# Patient Record
Sex: MALE | Race: WHITE
[De-identification: names, ages, dates, MRNs, and addresses within clinical notes are randomized per-mention and may not be internally consistent; named-entity substitution may affect disease eponyms.]

---

## 2018-12-21 ENCOUNTER — HOSPITAL ENCOUNTER (EMERGENCY)
Dept: HOSPITAL 35 - ER | Age: 68
Discharge: HOME | End: 2018-12-21
Payer: COMMERCIAL

## 2018-12-21 VITALS — WEIGHT: 225 LBS | BODY MASS INDEX: 33.33 KG/M2 | HEIGHT: 69 IN

## 2018-12-21 VITALS — DIASTOLIC BLOOD PRESSURE: 90 MMHG | SYSTOLIC BLOOD PRESSURE: 162 MMHG

## 2018-12-21 DIAGNOSIS — E78.5: ICD-10-CM
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
## 2018-12-21 DIAGNOSIS — S61.211A: Primary | ICD-10-CM

## 2018-12-21 DIAGNOSIS — S61.213A: ICD-10-CM
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
## 2018-12-24 ENCOUNTER — HOSPITAL ENCOUNTER (EMERGENCY)
Dept: HOSPITAL 35 - ER | Age: 68
Discharge: HOME | End: 2018-12-24
Payer: COMMERCIAL

## 2018-12-24 VITALS — WEIGHT: 225 LBS | BODY MASS INDEX: 33.33 KG/M2 | HEIGHT: 69 IN

## 2018-12-24 VITALS — SYSTOLIC BLOOD PRESSURE: 135 MMHG | DIASTOLIC BLOOD PRESSURE: 75 MMHG

## 2018-12-24 DIAGNOSIS — K75.9: ICD-10-CM

## 2018-12-24 DIAGNOSIS — S61.211D: Primary | ICD-10-CM
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
## 2018-12-24 DIAGNOSIS — E78.5: ICD-10-CM

## 2018-12-24 DIAGNOSIS — S61.213D: ICD-10-CM

## 2020-08-04 ENCOUNTER — HOSPITAL ENCOUNTER (EMERGENCY)
Dept: HOSPITAL 35 - ER | Age: 70
Discharge: HOME | End: 2020-08-04
Payer: COMMERCIAL

## 2020-08-04 VITALS — BODY MASS INDEX: 32.21 KG/M2 | HEIGHT: 70 IN | WEIGHT: 225 LBS

## 2020-08-04 VITALS — SYSTOLIC BLOOD PRESSURE: 143 MMHG | DIASTOLIC BLOOD PRESSURE: 76 MMHG
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
## 2020-08-04 DIAGNOSIS — S20.212A: ICD-10-CM
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
## 2020-08-04 DIAGNOSIS — S00.83XA: ICD-10-CM

## 2020-08-04 DIAGNOSIS — S82.831A: Primary | ICD-10-CM

## 2020-08-04 DIAGNOSIS — E78.5: ICD-10-CM
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
## 2020-08-04 DIAGNOSIS — Z79.899: ICD-10-CM
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
## 2021-10-22 ENCOUNTER — HOSPITAL ENCOUNTER (INPATIENT)
Dept: HOSPITAL 35 - ER | Age: 71
LOS: 2 days | Discharge: HOME | DRG: 149 | End: 2021-10-24
Attending: HOSPITALIST | Admitting: HOSPITALIST
Payer: COMMERCIAL

## 2021-10-22 VITALS — SYSTOLIC BLOOD PRESSURE: 140 MMHG | DIASTOLIC BLOOD PRESSURE: 53 MMHG

## 2021-10-22 VITALS — DIASTOLIC BLOOD PRESSURE: 86 MMHG | SYSTOLIC BLOOD PRESSURE: 150 MMHG

## 2021-10-22 VITALS — WEIGHT: 212 LBS | BODY MASS INDEX: 31.4 KG/M2 | HEIGHT: 69.02 IN

## 2021-10-22 VITALS — DIASTOLIC BLOOD PRESSURE: 80 MMHG | SYSTOLIC BLOOD PRESSURE: 140 MMHG

## 2021-10-22 VITALS — SYSTOLIC BLOOD PRESSURE: 161 MMHG | DIASTOLIC BLOOD PRESSURE: 86 MMHG

## 2021-10-22 VITALS — DIASTOLIC BLOOD PRESSURE: 61 MMHG | SYSTOLIC BLOOD PRESSURE: 123 MMHG

## 2021-10-22 DIAGNOSIS — F41.9: ICD-10-CM

## 2021-10-22 DIAGNOSIS — E78.5: ICD-10-CM

## 2021-10-22 DIAGNOSIS — K58.9: ICD-10-CM

## 2021-10-22 DIAGNOSIS — F31.9: ICD-10-CM

## 2021-10-22 DIAGNOSIS — H81.10: Primary | ICD-10-CM

## 2021-10-22 DIAGNOSIS — Z79.899: ICD-10-CM

## 2021-10-22 DIAGNOSIS — Z20.822: ICD-10-CM

## 2021-10-22 LAB
ALBUMIN SERPL-MCNC: 3.6 G/DL (ref 3.4–5)
ALT SERPL-CCNC: 32 U/L (ref 30–65)
ANION GAP SERPL CALC-SCNC: 9 MMOL/L (ref 7–16)
AST SERPL-CCNC: 32 U/L (ref 15–37)
BASOPHILS NFR BLD AUTO: 0.6 % (ref 0–2)
BILIRUB SERPL-MCNC: 0.5 MG/DL (ref 0.2–1)
BILIRUB UR-MCNC: NEGATIVE MG/DL
BUN SERPL-MCNC: 15 MG/DL (ref 7–18)
CALCIUM SERPL-MCNC: 8.9 MG/DL (ref 8.5–10.1)
CHLORIDE SERPL-SCNC: 105 MMOL/L (ref 98–107)
CO2 SERPL-SCNC: 28 MMOL/L (ref 21–32)
COLOR UR: YELLOW
CREAT SERPL-MCNC: 0.9 MG/DL (ref 0.7–1.3)
EOSINOPHIL NFR BLD: 1.2 % (ref 0–3)
ERYTHROCYTE [DISTWIDTH] IN BLOOD BY AUTOMATED COUNT: 12.4 % (ref 10.5–14.5)
FOLATE SERPL-MCNC: 19.7 NG/ML (ref 8.6–58.9)
GLUCOSE SERPL-MCNC: 107 MG/DL (ref 74–106)
GRANULOCYTES NFR BLD MANUAL: 63.3 % (ref 36–66)
HCT VFR BLD CALC: 42.6 % (ref 42–52)
HGB BLD-MCNC: 14.2 GM/DL (ref 14–18)
KETONES UR STRIP-MCNC: NEGATIVE MG/DL
LYMPHOCYTES NFR BLD AUTO: 26.4 % (ref 24–44)
MCH RBC QN AUTO: 30.6 PG (ref 26–34)
MCHC RBC AUTO-ENTMCNC: 33.4 G/DL (ref 28–37)
MCV RBC: 91.5 FL (ref 80–100)
MONOCYTES NFR BLD: 8.5 % (ref 1–8)
NEUTROPHILS # BLD: 3.9 THOU/UL (ref 1.4–8.2)
PLATELET # BLD: 357 THOU/UL (ref 150–400)
POTASSIUM SERPL-SCNC: 3.7 MMOL/L (ref 3.5–5.1)
PROT SERPL-MCNC: 6.8 G/DL (ref 6.4–8.2)
RBC # BLD AUTO: 4.66 MIL/UL (ref 4.5–6)
RBC # UR STRIP: NEGATIVE /UL
SODIUM SERPL-SCNC: 142 MMOL/L (ref 136–145)
SP GR UR STRIP: 1.02 (ref 1–1.03)
URINE CLARITY: CLEAR
URINE GLUCOSE-RANDOM*: NEGATIVE
URINE LEUKOCYTES-REFLEX: NEGATIVE
URINE NITRITE-REFLEX: NEGATIVE
URINE PROTEIN (DIPSTICK): NEGATIVE
UROBILINOGEN UR STRIP-ACNC: 1 E.U./DL (ref 0.2–1)
VIT B12 SERPL-MCNC: 495 PG/ML (ref 193–986)
WBC # BLD AUTO: 6.2 THOU/UL (ref 4–11)

## 2021-10-22 PROCEDURE — 10194: CPT

## 2021-10-22 NOTE — EKG
[address]  [address]
Phone:  [phone]                    ELECTROCARDIOGRAM REPORT      
_______________________________________________________________________________
 
Name:       JESUS MANUEL GÓMEZ                [facility]                     [id number]     Account #:      [id number]  
Admission:  10/22/21    Attend Phys:                          
Discharge:              Date of Birth:  [birth year]  
                                                          Report #: [id number]
   [id number]
_______________________________________________________________________________
                         [facility] ED
                                       
Test Date:    2021-10-22               Test Time:    14:28:30
Pat Name:     JESUS MANUEL GÓMEZ           Department:   
Patient ID:   [id number]            Room:          
Gender:       M                        Technician:   ZOILA
:          1950               Requested By: Marycarmen Rowell
Order Number: [id number] MD:   Lj Palmer
                                 Measurements
Intervals                              Axis          
Rate:         64                       P:            16
NV:           140                      QRS:          47
QRSD:         81                       T:            66
QT:           413                                    
QTc:          426                                    
                           Interpretive Statements
Sinus rhythm
Abnormal R-wave progression, early transition
No previous ECG available for comparison
Electronically Signed On 10- 16:10:19 CDT by Lj Palmer
[email or web address]
 
 
 
 
 
 
 
 
 
 
 
 
 
 
 
 
 
 
 
 
 
 
  <ELECTRONICALLY SIGNED>
   By: Lj Palmer MD, [facility]    
  10/22/21     1610
D: 10/22/21 1428                           _____________________________________
T: 10/22/21 1428                           Lj Palmer MD, FACC      /EPI

## 2021-10-23 VITALS — DIASTOLIC BLOOD PRESSURE: 67 MMHG | SYSTOLIC BLOOD PRESSURE: 137 MMHG

## 2021-10-23 VITALS — SYSTOLIC BLOOD PRESSURE: 139 MMHG | DIASTOLIC BLOOD PRESSURE: 68 MMHG

## 2021-10-23 VITALS — DIASTOLIC BLOOD PRESSURE: 70 MMHG | SYSTOLIC BLOOD PRESSURE: 118 MMHG

## 2021-10-23 VITALS — SYSTOLIC BLOOD PRESSURE: 130 MMHG | DIASTOLIC BLOOD PRESSURE: 77 MMHG

## 2021-10-23 LAB
ANION GAP SERPL CALC-SCNC: 8 MMOL/L (ref 7–16)
BASOPHILS NFR BLD AUTO: 0.2 % (ref 0–2)
BUN SERPL-MCNC: 15 MG/DL (ref 7–18)
CALCIUM SERPL-MCNC: 8.2 MG/DL (ref 8.5–10.1)
CHLORIDE SERPL-SCNC: 109 MMOL/L (ref 98–107)
CHOLEST SERPL-MCNC: 145 MG/DL (ref ?–200)
CO2 SERPL-SCNC: 29 MMOL/L (ref 21–32)
CREAT SERPL-MCNC: 0.9 MG/DL (ref 0.7–1.3)
EOSINOPHIL NFR BLD: 1.3 % (ref 0–3)
ERYTHROCYTE [DISTWIDTH] IN BLOOD BY AUTOMATED COUNT: 12.3 % (ref 10.5–14.5)
EST. AVERAGE GLUCOSE BLD GHB EST-MCNC: 114 MG/DL
GLUCOSE SERPL-MCNC: 98 MG/DL (ref 74–106)
GLYCOHEMOGLOBIN (HGB A1C): 5.6 % (ref 4.8–5.6)
GRANULOCYTES NFR BLD MANUAL: 58 % (ref 36–66)
HCT VFR BLD CALC: 37.3 % (ref 42–52)
HDLC SERPL-MCNC: 52 MG/DL (ref 40–?)
HGB BLD-MCNC: 12.6 GM/DL (ref 14–18)
LDLC SERPL-MCNC: 80 MG/DL (ref ?–100)
LYMPHOCYTES NFR BLD AUTO: 30.8 % (ref 24–44)
MAGNESIUM SERPL-MCNC: 1.9 MG/DL (ref 1.8–2.4)
MCH RBC QN AUTO: 31 PG (ref 26–34)
MCHC RBC AUTO-ENTMCNC: 33.9 G/DL (ref 28–37)
MCV RBC: 91.6 FL (ref 80–100)
MONOCYTES NFR BLD: 9.7 % (ref 1–8)
NEUTROPHILS # BLD: 3.7 THOU/UL (ref 1.4–8.2)
PLATELET # BLD: 309 THOU/UL (ref 150–400)
POTASSIUM SERPL-SCNC: 3.6 MMOL/L (ref 3.5–5.1)
RBC # BLD AUTO: 4.07 MIL/UL (ref 4.5–6)
SODIUM SERPL-SCNC: 146 MMOL/L (ref 136–145)
TC:HDL: 2.8 RATIO
TRIGL SERPL-MCNC: 66 MG/DL (ref ?–150)
VLDLC SERPL CALC-MCNC: 13 MG/DL (ref ?–40)
WBC # BLD AUTO: 6.5 THOU/UL (ref 4–11)

## 2021-10-23 NOTE — NUR
PT ARRIVED TO THE UNIT AT APPROXIMATELY 1900 FROM ED. ACCOMPAINIED BY STAFF.
PT REMAIN ALERT AND ORIENT TIMES FOUR. DENIES PAIN AND NAUSEA. DID C/O HAVING
A HEADACHE LATER IN THE MORNING. PRN TYLENOL GIVEN WITH GOOD RESULTS. VSS,
AFEBRILE. SR PER MONITOR.  , MATT AT THE BEDSIDE AND ORDERED A
ROUTINE MRI OF HEAD R/T DIZZINESS.  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
DENIES VERTIGO. UP WITH STANDBY ASSIST TO TOILET. UP WITH GB AND WALKER. GAIT
SLOW AND STEADY PER TECH.  PT/OT CONSULT PLACED. SLOW PROGRESS TOWARDS DC
GOALS, WILL CONTINUE TO MONITOR.

## 2021-10-24 VITALS — SYSTOLIC BLOOD PRESSURE: 127 MMHG | DIASTOLIC BLOOD PRESSURE: 72 MMHG

## 2021-10-24 VITALS — DIASTOLIC BLOOD PRESSURE: 73 MMHG | SYSTOLIC BLOOD PRESSURE: 127 MMHG

## 2021-10-24 NOTE — NUR
PATIENT DISCHARGED TO HOME. PATIENT EDUCATION AND TEACHING COMPLETED, NO
QUESTIONS OR CONERNS FROM PT. IV AND TELE REMOVED. TAKEN IN WHEELCHAIR BY
STAFF TO WAITING VEHICLE WITH FAMILY.

## 2021-10-24 NOTE — NUR
RECEIVED PATIENT AT 1900H.PATIEN TIS ALERT AND ORIENTED X4.ON ROOM AIR
BREATHING SPONTANEOUSLY.NOT IN PAIN OR DISTRESS.PATIENT REQUESTED TO TAKE THE
PRN TRAZADONE TOGETHER JENNI CHAPARRO AS  ACCORDING TO HIM HE HAS BEEN TAKING IT AT
HOME TOGETHER.PATIENT WAS ABLE TO SLEEP.ALL NEEDS ATTENDED.

## 2021-11-03 NOTE — HC
[facility]
[address] [place]   [address]                     CONSULTATION                  
_______________________________________________________________________________
 
Name:       JESUS MANUEL GÓMEZ                Room #:         211-P       [facility] [place]  
M.R.#:      [id number]                       Account #:      [id number]  
Admission:  10/22/21    Attend Phys:    Shayne Richardson MD     
Discharge:  10/24/21    Date of Birth:  [birth year]  
                                                          Report #: [id number]
                                                                    [id number] 
_______________________________________________________________________________
THIS REPORT FOR:  
 
cc:  Jackeline Herndon MD, Tiffany MD Khosla,Sergio GODDARD MD                                         ~
 
DATE OF SERVICE: 10/22/2021
 
HISTORY OF PRESENT ILLNESS:  This is a 71-year-old male patient who was 
evaluated by me for any neurological etiology for dizziness.  Apparently, this 
patient says that about 5-6 years ago, he had an episode of dizziness, which 
lasted a few weeks.  They did the workup at that time and subsequently put him 
on vestibular exercises and it took a long time, but subsequently resolved.  
Since then, whenever he turns at night, he becomes severely dizzy.  He came up 
with another severe episode of dizziness.
 
I was called from the emergency room and they had indicated that the patient has
presented with severe dizziness, which was not resolving because of the 
medications.  The patient has nystagmus, but that is the only history that was 
available to me and I had recommended if BUN and creatinine was normal, then 
proceed with a CT angiogram of the head and neck.  That was done and that was 
unremarkable.
 
REVIEW OF SYSTEMS:  Indicates this patient has a pretty significant psychiatric 
problems including bipolar.  He says he does not go to a psychiatrist, but he is
on multiple medications, which typically psychiatric prescribes.  He has gone to
a neurologist, Dr. Ritter at [facility] and followed up with 
him.  He follows up with an ENT physician and he follows up with his family 
doctor.  He is on numerous medications including trazodone, lamotrigine, 
ropinirole, alprazolam, etc.  A 14-point review of system was otherwise 
noncontributory.
 
PAST MEDICAL HISTORY:  Positive for similar spell a few years ago.
 
FAMILY HISTORY:  Noncontributory.
 
SOCIAL HISTORY:  He said he does not smoke or drink any alcohol.
 
PHYSICAL EXAMINATION:
VITAL SIGNS:  His blood pressure is 150 86, respirations 18, pulse is 63, 
temperature is 97.8.
GENERAL:  The patient's examination indicates he is alert, responsive, able to 
follow simple and complex command.  His speech looks unremarkable.  His cranial 
nerve examination does indicate nystagmus on looking towards the left.  He does 
not change direction.
NEUROMUSCULAR:  Examinations appears noncontributory.  He is a well-built 
 
 
 
[place] [address]                     CONSULTATION                  
_______________________________________________________________________________
 
Name:       JESUS MANUEL GÓMEZ                Room #:         211-P       [facility] [place]  
M.R.#:      [id number]                       Account #:      [id number]  
Admission:  10/22/21    Attend Phys:    Shayne Richardson MD     
Discharge:  10/24/21    Date of Birth:  [birth year]  
                                                          Report #: [id number]
                                                                    [id number] 
_______________________________________________________________________________
 
individual.  His hearing and vision looks adequate.  He has no thyroid mass.  
There is no carotid bruit.  There is no edema.  Pulses are palpable.
 
LABORATORY DATA:  White count is 6.2.  Sodium is normal.
IMPRESSION AND PLAN:  This patient's dizziness is most likely related to ENT 
pathology.  This is a second episode.  I will suggest an ENT consult.  
Neurological etiology is unlikely.  This is especially because CT angiogram is 
normal and there is no basilar artery problem, which can sometimes cause similar
problem.  We can repeat the MRI, but otherwise the main management is going to 
be by ENT.  If MRI does show a stroke, that will change things and then the 
patient will need further neurological workup.  All of it was discussed with the
patient in detail and more than 50 minutes of time was spent taking care of this
patient today and majority was spent reviewing his studies, his records, talking
to Emergency Room physician, talking to him and reviewing his records in the 
computer.
 
Thank you very much for this referral.
 
 
 
 
 
 
 
 
 
 
 
 
 
 
 
 
 
 
 
 
 
 
 
 
 
 
  <ELECTRONICALLY SIGNED>
   By: Sergio Barrett MD         
  11/03/21     1009
D: 10/22/21 1917                           _____________________________________
T: 10/22/21 2348                           Sergio Barrett MD           /nt

## 2022-02-20 ENCOUNTER — HOSPITAL ENCOUNTER (EMERGENCY)
Dept: HOSPITAL 35 - ER | Age: 72
Discharge: ANOTHER HOSPITAL | End: 2022-02-20
Payer: COMMERCIAL

## 2022-02-20 VITALS — WEIGHT: 214.99 LBS | BODY MASS INDEX: 30.78 KG/M2 | HEIGHT: 70 IN

## 2022-02-20 VITALS — DIASTOLIC BLOOD PRESSURE: 97 MMHG | SYSTOLIC BLOOD PRESSURE: 166 MMHG
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
## 2022-02-20 DIAGNOSIS — S68.121A: Primary | ICD-10-CM

## 2022-02-20 DIAGNOSIS — E78.5: ICD-10-CM
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
## 2022-02-20 LAB
ALBUMIN SERPL-MCNC: 3.9 G/DL (ref 3.4–5)
ALT SERPL-CCNC: 31 U/L (ref 16–63)
ANION GAP SERPL CALC-SCNC: 7 MMOL/L (ref 7–16)
AST SERPL-CCNC: 28 U/L (ref 15–37)
BASOPHILS NFR BLD AUTO: 0.7 % (ref 0–2)
BILIRUB SERPL-MCNC: 0.6 MG/DL (ref 0.2–1)
BUN SERPL-MCNC: 24 MG/DL (ref 7–18)
CALCIUM SERPL-MCNC: 9.2 MG/DL (ref 8.5–10.1)
CHLORIDE SERPL-SCNC: 101 MMOL/L (ref 98–107)
CO2 SERPL-SCNC: 26 MMOL/L (ref 21–32)
CREAT SERPL-MCNC: 1 MG/DL (ref 0.7–1.3)
EOSINOPHIL NFR BLD: 1.6 % (ref 0–3)
ERYTHROCYTE [DISTWIDTH] IN BLOOD BY AUTOMATED COUNT: 13 % (ref 10.5–14.5)
GLUCOSE SERPL-MCNC: 100 MG/DL (ref 74–106)
GRANULOCYTES NFR BLD MANUAL: 46.5 % (ref 36–66)
HCT VFR BLD CALC: 40.6 % (ref 42–52)
HGB BLD-MCNC: 13.7 GM/DL (ref 14–18)
INR PPP: 0.98
LYMPHOCYTES NFR BLD AUTO: 39.4 % (ref 24–44)
MCH RBC QN AUTO: 30.6 PG (ref 26–34)
MCHC RBC AUTO-ENTMCNC: 33.8 G/DL (ref 28–37)
MCV RBC: 90.7 FL (ref 80–100)
MONOCYTES NFR BLD: 11.8 % (ref 1–8)
NEUTROPHILS # BLD: 2.4 THOU/UL (ref 1.4–8.2)
PLATELET # BLD: 320 THOU/UL (ref 150–400)
POTASSIUM SERPL-SCNC: 3.8 MMOL/L (ref 3.5–5.1)
PROT SERPL-MCNC: 6.9 G/DL (ref 6.4–8.2)
PROTHROMBIN TIME: 10.7 SECONDS (ref 10.5–12.1)
RBC # BLD AUTO: 4.48 MIL/UL (ref 4.5–6)
SODIUM SERPL-SCNC: 134 MMOL/L (ref 136–145)
WBC # BLD AUTO: 5.2 THOU/UL (ref 4–11)